# Patient Record
Sex: MALE | Race: BLACK OR AFRICAN AMERICAN | Employment: FULL TIME | URBAN - METROPOLITAN AREA
[De-identification: names, ages, dates, MRNs, and addresses within clinical notes are randomized per-mention and may not be internally consistent; named-entity substitution may affect disease eponyms.]

---

## 2023-11-27 ENCOUNTER — OFFICE VISIT (OUTPATIENT)
Dept: UROLOGY | Facility: CLINIC | Age: 43
End: 2023-11-27
Payer: COMMERCIAL

## 2023-11-27 VITALS — BODY MASS INDEX: 30.48 KG/M2 | HEIGHT: 73 IN | WEIGHT: 230 LBS

## 2023-11-27 DIAGNOSIS — F17.200 NEEDS SMOKING CESSATION EDUCATION: ICD-10-CM

## 2023-11-27 DIAGNOSIS — N52.9 ERECTILE DYSFUNCTION, UNSPECIFIED ERECTILE DYSFUNCTION TYPE: Primary | ICD-10-CM

## 2023-11-27 PROCEDURE — 99203 OFFICE O/P NEW LOW 30 MIN: CPT | Performed by: NURSE PRACTITIONER

## 2023-11-27 PROCEDURE — 99213 OFFICE O/P EST LOW 20 MIN: CPT | Performed by: NURSE PRACTITIONER

## 2023-11-27 RX ORDER — DOXYCYCLINE HYCLATE 100 MG
TABLET ORAL
COMMUNITY
Start: 2023-11-21

## 2023-11-27 ASSESSMENT — COLUMBIA-SUICIDE SEVERITY RATING SCALE - C-SSRS
6. HAVE YOU EVER DONE ANYTHING, STARTED TO DO ANYTHING, OR PREPARED TO DO ANYTHING TO END YOUR LIFE?: NO
2. HAVE YOU ACTUALLY HAD ANY THOUGHTS OF KILLING YOURSELF?: NO
1. IN THE PAST MONTH, HAVE YOU WISHED YOU WERE DEAD OR WISHED YOU COULD GO TO SLEEP AND NOT WAKE UP?: NO

## 2023-11-27 ASSESSMENT — PATIENT HEALTH QUESTIONNAIRE - PHQ9
SUM OF ALL RESPONSES TO PHQ9 QUESTIONS 1 AND 2: 0
1. LITTLE INTEREST OR PLEASURE IN DOING THINGS: NOT AT ALL
2. FEELING DOWN, DEPRESSED OR HOPELESS: NOT AT ALL

## 2023-11-27 ASSESSMENT — ENCOUNTER SYMPTOMS
DEPRESSION: 0
LOSS OF SENSATION IN FEET: 0
OCCASIONAL FEELINGS OF UNSTEADINESS: 0

## 2023-11-27 ASSESSMENT — PAIN SCALES - GENERAL: PAINLEVEL: 0-NO PAIN

## 2023-11-27 NOTE — PROGRESS NOTES
[facility]  Outpatient Clinic Note      Patient: Ben Burrows  Age/Sex: 43 y.o., male  MRN: [id number]      History of Present Illness  43-year-old gentleman presents as new patient for evaluation of difficulties obtaining morning erections. This has been ongoing for about 1.5 years, but feels it is worsening. He is otherwise able to obtain and maintain spontaneous erections. Reports adequate libido. He is not interested in medical therapy. He does work second shift and often third shift. He is a daily smoker. Unknown family history.     Past Medical & Surgical History  No past medical history on file.   Past Surgical History:   Procedure Laterality Date    CT ANGIO NECK  6/3/2023    CT ANGIO NECK 6/3/2023          Labs  Component      Latest Ref Rng 4/1/2022   GLUCOSE      74 - 99 mg/dL 102 (H)    SODIUM      136 - 145 mmol/L 136    POTASSIUM      3.5 - 5.3 mmol/L 5.0    CHLORIDE      98 - 107 mmol/L 102    Bicarbonate      21 - 32 mmol/L 28    Anion Gap      10 - 20 mmol/L 11    Blood Urea Nitrogen      6 - 23 mg/dL 16    Creatinine      0.50 - 1.30 mg/dL 1.14    GFR MALE      >90 mL/min/1.73m2 82    Calcium      8.6 - 10.6 mg/dL 10.0    Albumin      3.4 - 5.0 g/dL 4.6    Alkaline Phosphatase      33 - 120 U/L 68    Total Protein      6.4 - 8.2 g/dL 7.1    AST      9 - 39 U/L 25    Bilirubin Total      0.0 - 1.2 mg/dL 0.6    ALT      10 - 52 U/L 26    CHOLESTEROL      0 - 199 mg/dL 189    HDL CHOLESTEROL      mg/dL 59.4    Cholesterol/HDL Ratio 3.2    LDL Calculated      0 - 99 mg/dL 118 (H)    VLDL      0 - 40 mg/dL 12    TRIGLYCERIDES      0 - 149 mg/dL 59    Hemoglobin A1C      % 5.4    Estimated Average Glucose      MG/    Thyroid Stimulating Hormone      0.44 - 3.98 mIU/L 1.41    PSA      0.00 - 4.00 ng/mL 2.07       Legend:  (H) High    Medications:  No current outpatient medications on file prior to visit.     No current facility-administered medications on file prior to visit.           Physical Exam                                                                                                                      General: Well developed, well nourished, alert and cooperative, appears in no acute distress  Eyes: Non-injected conjunctiva, sclera clear, no proptosis  Cardiac: Extremities are warm and well perfused. No edema, cyanosis or pallor.   Lungs: Breathing is easy, non-labored. Speaking in clear and complete sentences. Normal diaphragmatic movement.  MSK: Ambulatory with steady gait, unassisted  Neuro: alert and oriented to person, place and time  Psych: Demonstrates good judgement and reason, without hallucinations, abnormal affect or abnormal behaviors.  Skin: no obvious lesions, no rashes      Review of Systems  Review of Systems   All other systems reviewed and are negative.         Imaging  NA      Assessment & Plan  43-year-old gentleman presents as new patient for evaluation of difficulties obtaining morning erections. This has been ongoing for about 1.5 years, but feels it is worsening. He is otherwise able to obtain and maintain spontaneous erections. Reports adequate libido. He is not interested in medical therapy. He does work second shift and often third shift. He is a daily smoker. Unknown family history.     Patient was seen today with the complaint of erectile dysfunction (ED). I went over the definition of ED, which is the inability to obtain or maintain an erection sufficient for satisfactory sexual activity. I outlined that erectile function is a complex interplay of neural, vascular, hormonal and psychological factors. Disruption in any of these pathways may lead to ED. In terms of treatment options; PDE5i (Viagra, Cialis, etc.), intracavernosal injections (ICI), vacuum erection devices (HARJINDER) and penile implants were discussed in detail.      Will obtain early AM testosterone and repeat PSA. Discussed smoking cessation in great detail. Recommend sleep hygiene, denies  COLIN. Recommend close follow-up with PCP to rule out underlying cardiac cause.    Follow-up 4 weeks to review labs, or sooner if needed.      Reviewed and approved by THEO MCDONALD on 11/27/23 at 9:49 AM.

## 2023-11-28 ENCOUNTER — LAB (OUTPATIENT)
Dept: LAB | Facility: LAB | Age: 43
End: 2023-11-28
Payer: COMMERCIAL

## 2023-11-28 DIAGNOSIS — N52.9 ERECTILE DYSFUNCTION, UNSPECIFIED ERECTILE DYSFUNCTION TYPE: ICD-10-CM

## 2023-11-28 LAB
PSA SERPL-MCNC: 1.89 NG/ML
TESTOST SERPL-MCNC: 608 NG/DL (ref 240–1000)

## 2023-11-28 PROCEDURE — 36415 COLL VENOUS BLD VENIPUNCTURE: CPT

## 2023-11-28 PROCEDURE — 84403 ASSAY OF TOTAL TESTOSTERONE: CPT

## 2023-11-28 PROCEDURE — 84153 ASSAY OF PSA TOTAL: CPT

## 2024-01-08 ENCOUNTER — APPOINTMENT (OUTPATIENT)
Dept: UROLOGY | Facility: CLINIC | Age: 44
End: 2024-01-08
Payer: COMMERCIAL

## 2024-04-18 RX ORDER — CLINDAMYCIN PHOSPHATE 11.9 MG/ML
SOLUTION TOPICAL
COMMUNITY
Start: 2023-11-21

## 2024-05-06 ENCOUNTER — APPOINTMENT (OUTPATIENT)
Dept: UROLOGY | Facility: CLINIC | Age: 44
End: 2024-05-06
Payer: COMMERCIAL
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
## 2024-07-16 ENCOUNTER — HOSPITAL ENCOUNTER (OUTPATIENT)
Dept: RADIOLOGY | Facility: HOSPITAL | Age: 44
Discharge: HOME | End: 2024-07-16
Payer: COMMERCIAL

## 2024-07-16 ENCOUNTER — LAB (OUTPATIENT)
Dept: LAB | Facility: LAB | Age: 44
End: 2024-07-16
Payer: COMMERCIAL

## 2024-07-16 ENCOUNTER — OFFICE VISIT (OUTPATIENT)
Dept: PRIMARY CARE | Facility: CLINIC | Age: 44
End: 2024-07-16
Payer: COMMERCIAL

## 2024-07-16 VITALS
DIASTOLIC BLOOD PRESSURE: 76 MMHG | HEART RATE: 72 BPM | TEMPERATURE: 97.9 F | OXYGEN SATURATION: 100 % | SYSTOLIC BLOOD PRESSURE: 114 MMHG

## 2024-07-16 DIAGNOSIS — Z13.6 SCREENING FOR HEART DISEASE: ICD-10-CM

## 2024-07-16 DIAGNOSIS — Z87.09 HISTORY OF PNEUMOTHORAX: ICD-10-CM

## 2024-07-16 DIAGNOSIS — Z13.9 SCREENING DUE: ICD-10-CM

## 2024-07-16 DIAGNOSIS — R07.9 CHEST PAIN, UNSPECIFIED TYPE: Primary | ICD-10-CM

## 2024-07-16 DIAGNOSIS — N52.9 ERECTILE DYSFUNCTION, UNSPECIFIED ERECTILE DYSFUNCTION TYPE: ICD-10-CM

## 2024-07-16 DIAGNOSIS — R07.9 CHEST PAIN, UNSPECIFIED TYPE: ICD-10-CM

## 2024-07-16 DIAGNOSIS — K21.9 GASTROESOPHAGEAL REFLUX DISEASE, UNSPECIFIED WHETHER ESOPHAGITIS PRESENT: ICD-10-CM

## 2024-07-16 LAB
ALBUMIN SERPL-MCNC: 4.4 G/DL (ref 3.5–5)
ALP BLD-CCNC: 66 U/L (ref 35–125)
ALT SERPL-CCNC: 20 U/L (ref 5–40)
ANION GAP SERPL CALC-SCNC: 11 MMOL/L
AST SERPL-CCNC: 27 U/L (ref 5–40)
BILIRUB SERPL-MCNC: 0.4 MG/DL (ref 0.1–1.2)
BUN SERPL-MCNC: 14 MG/DL (ref 8–25)
CALCIUM SERPL-MCNC: 9 MG/DL (ref 8.5–10.4)
CHLORIDE SERPL-SCNC: 104 MMOL/L (ref 97–107)
CHOLEST SERPL-MCNC: 210 MG/DL (ref 133–200)
CHOLEST/HDLC SERPL: 2.8 {RATIO}
CO2 SERPL-SCNC: 24 MMOL/L (ref 24–31)
CREAT SERPL-MCNC: 1.2 MG/DL (ref 0.4–1.6)
D DIMER PPP FEU-MCNC: <0.19 MG/L FEU (ref 0.19–0.5)
EGFRCR SERPLBLD CKD-EPI 2021: 76 ML/MIN/1.73M*2
ERYTHROCYTE [DISTWIDTH] IN BLOOD BY AUTOMATED COUNT: 14.1 % (ref 11.5–14.5)
GLUCOSE SERPL-MCNC: 110 MG/DL (ref 65–99)
HCT VFR BLD AUTO: 41.3 % (ref 41–52)
HDLC SERPL-MCNC: 76 MG/DL
HGB BLD-MCNC: 14 G/DL (ref 13.5–17.5)
LDLC SERPL CALC-MCNC: 120 MG/DL (ref 65–130)
MCH RBC QN AUTO: 30.3 PG (ref 26–34)
MCHC RBC AUTO-ENTMCNC: 33.9 G/DL (ref 32–36)
MCV RBC AUTO: 89 FL (ref 80–100)
MUCOUS THREADS #/AREA URNS AUTO: NORMAL /LPF
NRBC BLD-RTO: 0 /100 WBCS (ref 0–0)
PLATELET # BLD AUTO: 221 X10*3/UL (ref 150–450)
POTASSIUM SERPL-SCNC: 4.7 MMOL/L (ref 3.4–5.1)
PROT SERPL-MCNC: 6.3 G/DL (ref 5.9–7.9)
RBC # BLD AUTO: 4.62 X10*6/UL (ref 4.5–5.9)
RBC #/AREA URNS AUTO: NORMAL /HPF
SODIUM SERPL-SCNC: 139 MMOL/L (ref 133–145)
TRIGL SERPL-MCNC: 71 MG/DL (ref 40–150)
WBC # BLD AUTO: 4.9 X10*3/UL (ref 4.4–11.3)
WBC #/AREA URNS AUTO: NORMAL /HPF

## 2024-07-16 PROCEDURE — 80053 COMPREHEN METABOLIC PANEL: CPT

## 2024-07-16 PROCEDURE — 99214 OFFICE O/P EST MOD 30 MIN: CPT | Performed by: FAMILY MEDICINE

## 2024-07-16 PROCEDURE — 85027 COMPLETE CBC AUTOMATED: CPT

## 2024-07-16 PROCEDURE — 71046 X-RAY EXAM CHEST 2 VIEWS: CPT | Performed by: RADIOLOGY

## 2024-07-16 PROCEDURE — 85300 ANTITHROMBIN III ACTIVITY: CPT

## 2024-07-16 PROCEDURE — 81001 URINALYSIS AUTO W/SCOPE: CPT

## 2024-07-16 PROCEDURE — 80061 LIPID PANEL: CPT

## 2024-07-16 PROCEDURE — 93000 ELECTROCARDIOGRAM COMPLETE: CPT | Performed by: FAMILY MEDICINE

## 2024-07-16 PROCEDURE — 93005 ELECTROCARDIOGRAM TRACING: CPT | Performed by: FAMILY MEDICINE

## 2024-07-16 PROCEDURE — 36415 COLL VENOUS BLD VENIPUNCTURE: CPT

## 2024-07-16 PROCEDURE — 71046 X-RAY EXAM CHEST 2 VIEWS: CPT

## 2024-07-16 ASSESSMENT — COLUMBIA-SUICIDE SEVERITY RATING SCALE - C-SSRS
1. IN THE PAST MONTH, HAVE YOU WISHED YOU WERE DEAD OR WISHED YOU COULD GO TO SLEEP AND NOT WAKE UP?: NO
6. HAVE YOU EVER DONE ANYTHING, STARTED TO DO ANYTHING, OR PREPARED TO DO ANYTHING TO END YOUR LIFE?: NO
2. HAVE YOU ACTUALLY HAD ANY THOUGHTS OF KILLING YOURSELF?: NO

## 2024-07-16 ASSESSMENT — ENCOUNTER SYMPTOMS
DEPRESSION: 0
OCCASIONAL FEELINGS OF UNSTEADINESS: 0
LOSS OF SENSATION IN FEET: 0

## 2024-07-16 ASSESSMENT — PAIN SCALES - GENERAL: PAINLEVEL: 0-NO PAIN

## 2024-07-16 ASSESSMENT — PATIENT HEALTH QUESTIONNAIRE - PHQ9
2. FEELING DOWN, DEPRESSED OR HOPELESS: NOT AT ALL
1. LITTLE INTEREST OR PLEASURE IN DOING THINGS: NOT AT ALL
SUM OF ALL RESPONSES TO PHQ9 QUESTIONS 1 AND 2: 0

## 2024-07-22 PROBLEM — E78.49 OTHER HYPERLIPIDEMIA: Status: ACTIVE | Noted: 2024-07-22

## 2024-07-22 PROBLEM — N52.9 MALE ERECTILE DISORDER: Status: ACTIVE | Noted: 2024-07-22
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
## 2024-07-31 ENCOUNTER — OFFICE VISIT (OUTPATIENT)
Dept: CARDIOLOGY | Facility: CLINIC | Age: 44
End: 2024-07-31
Payer: COMMERCIAL

## 2024-07-31 VITALS
OXYGEN SATURATION: 97 % | RESPIRATION RATE: 20 BRPM | TEMPERATURE: 98 F | DIASTOLIC BLOOD PRESSURE: 72 MMHG | WEIGHT: 262 LBS | HEART RATE: 84 BPM | SYSTOLIC BLOOD PRESSURE: 112 MMHG | BODY MASS INDEX: 34.72 KG/M2 | HEIGHT: 73 IN

## 2024-07-31 DIAGNOSIS — R07.9 CHEST PAIN, UNSPECIFIED TYPE: ICD-10-CM

## 2024-07-31 DIAGNOSIS — E78.49 OTHER HYPERLIPIDEMIA: Primary | ICD-10-CM

## 2024-07-31 DIAGNOSIS — K21.9 GASTROESOPHAGEAL REFLUX DISEASE WITHOUT ESOPHAGITIS: ICD-10-CM

## 2024-07-31 PROBLEM — S22.41XA MULTIPLE FRACTURES OF RIBS, RIGHT SIDE, INITIAL ENCOUNTER FOR CLOSED FRACTURE: Status: ACTIVE | Noted: 2023-06-03

## 2024-07-31 PROBLEM — S27.0XXA TRAUMATIC PNEUMOTHORAX: Status: ACTIVE | Noted: 2023-06-03

## 2024-07-31 PROBLEM — M25.511 RIGHT SHOULDER PAIN: Status: ACTIVE | Noted: 2024-07-31

## 2024-07-31 PROBLEM — E55.9 VITAMIN D DEFICIENCY: Status: ACTIVE | Noted: 2024-07-31

## 2024-07-31 PROCEDURE — 99213 OFFICE O/P EST LOW 20 MIN: CPT | Performed by: INTERNAL MEDICINE

## 2024-07-31 PROCEDURE — 99407 BEHAV CHNG SMOKING > 10 MIN: CPT | Performed by: INTERNAL MEDICINE

## 2024-07-31 PROCEDURE — 1036F TOBACCO NON-USER: CPT | Performed by: INTERNAL MEDICINE

## 2024-07-31 PROCEDURE — 99203 OFFICE O/P NEW LOW 30 MIN: CPT | Performed by: INTERNAL MEDICINE

## 2024-07-31 PROCEDURE — 3008F BODY MASS INDEX DOCD: CPT | Performed by: INTERNAL MEDICINE

## 2024-07-31 RX ORDER — NAPROXEN SODIUM 220 MG/1
81 TABLET, FILM COATED ORAL DAILY
Qty: 90 TABLET | Refills: 3 | Status: SHIPPED | OUTPATIENT
Start: 2024-07-31 | End: 2025-07-31

## 2024-07-31 ASSESSMENT — LIFESTYLE VARIABLES
HOW OFTEN DO YOU HAVE SIX OR MORE DRINKS ON ONE OCCASION: NEVER
AUDIT TOTAL SCORE: 4
AUDIT-C TOTAL SCORE: 4
HOW OFTEN DO YOU HAVE A DRINK CONTAINING ALCOHOL: 4 OR MORE TIMES A WEEK
HOW MANY STANDARD DRINKS CONTAINING ALCOHOL DO YOU HAVE ON A TYPICAL DAY: 1 OR 2
HAS A RELATIVE, FRIEND, DOCTOR, OR ANOTHER HEALTH PROFESSIONAL EXPRESSED CONCERN ABOUT YOUR DRINKING OR SUGGESTED YOU CUT DOWN: NO
HAVE YOU OR SOMEONE ELSE BEEN INJURED AS A RESULT OF YOUR DRINKING: NO
SKIP TO QUESTIONS 9-10: 1

## 2024-07-31 ASSESSMENT — PAIN SCALES - GENERAL: PAINLEVEL: 0-NO PAIN

## 2024-07-31 ASSESSMENT — ENCOUNTER SYMPTOMS
LOSS OF SENSATION IN FEET: 0
DEPRESSION: 0
OCCASIONAL FEELINGS OF UNSTEADINESS: 0
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
## 2024-10-23 ENCOUNTER — APPOINTMENT (OUTPATIENT)
Dept: RADIOLOGY | Facility: HOSPITAL | Age: 44
End: 2024-10-23
Payer: COMMERCIAL

## 2024-10-24 ENCOUNTER — HOSPITAL ENCOUNTER (EMERGENCY)
Facility: HOSPITAL | Age: 44
Discharge: HOME | End: 2024-10-24
Attending: EMERGENCY MEDICINE
Payer: COMMERCIAL

## 2024-10-24 ENCOUNTER — APPOINTMENT (OUTPATIENT)
Dept: RADIOLOGY | Facility: HOSPITAL | Age: 44
End: 2024-10-24
Payer: COMMERCIAL

## 2024-10-24 ENCOUNTER — APPOINTMENT (OUTPATIENT)
Dept: CARDIOLOGY | Facility: HOSPITAL | Age: 44
End: 2024-10-24
Payer: COMMERCIAL

## 2024-10-24 VITALS
SYSTOLIC BLOOD PRESSURE: 138 MMHG | HEART RATE: 60 BPM | DIASTOLIC BLOOD PRESSURE: 82 MMHG | HEIGHT: 73 IN | TEMPERATURE: 97.7 F | RESPIRATION RATE: 18 BRPM | OXYGEN SATURATION: 100 % | WEIGHT: 250 LBS | BODY MASS INDEX: 33.13 KG/M2

## 2024-10-24 DIAGNOSIS — M79.662 PAIN OF LEFT CALF: Primary | ICD-10-CM

## 2024-10-24 DIAGNOSIS — R07.9 CHEST PAIN, UNSPECIFIED TYPE: ICD-10-CM

## 2024-10-24 DIAGNOSIS — J18.9 COMMUNITY ACQUIRED PNEUMONIA OF RIGHT LOWER LOBE OF LUNG: ICD-10-CM

## 2024-10-24 LAB
ALBUMIN SERPL BCP-MCNC: 4.1 G/DL (ref 3.4–5)
ALP SERPL-CCNC: 58 U/L (ref 33–120)
ALT SERPL W P-5'-P-CCNC: 23 U/L (ref 10–52)
ANION GAP SERPL CALCULATED.3IONS-SCNC: 9 MMOL/L (ref 10–20)
APPEARANCE UR: CLEAR
AST SERPL W P-5'-P-CCNC: 25 U/L (ref 9–39)
BASOPHILS # BLD AUTO: 0.03 X10*3/UL (ref 0–0.1)
BASOPHILS NFR BLD AUTO: 0.5 %
BILIRUB SERPL-MCNC: 0.4 MG/DL (ref 0–1.2)
BILIRUB UR STRIP.AUTO-MCNC: NEGATIVE MG/DL
BNP SERPL-MCNC: 5 PG/ML (ref 0–99)
BUN SERPL-MCNC: 15 MG/DL (ref 6–23)
CALCIUM SERPL-MCNC: 8.5 MG/DL (ref 8.6–10.3)
CARDIAC TROPONIN I PNL SERPL HS: 4 NG/L (ref 0–20)
CARDIAC TROPONIN I PNL SERPL HS: 4 NG/L (ref 0–20)
CHLORIDE SERPL-SCNC: 99 MMOL/L (ref 98–107)
CO2 SERPL-SCNC: 25 MMOL/L (ref 21–32)
COLOR UR: ABNORMAL
CREAT SERPL-MCNC: 1.25 MG/DL (ref 0.5–1.3)
EGFRCR SERPLBLD CKD-EPI 2021: 72 ML/MIN/1.73M*2
EOSINOPHIL # BLD AUTO: 0.15 X10*3/UL (ref 0–0.7)
EOSINOPHIL NFR BLD AUTO: 2.4 %
ERYTHROCYTE [DISTWIDTH] IN BLOOD BY AUTOMATED COUNT: 12.8 % (ref 11.5–14.5)
GLUCOSE SERPL-MCNC: 99 MG/DL (ref 74–99)
GLUCOSE UR STRIP.AUTO-MCNC: NORMAL MG/DL
HCT VFR BLD AUTO: 41 % (ref 41–52)
HGB BLD-MCNC: 14.3 G/DL (ref 13.5–17.5)
HOLD SPECIMEN: NORMAL
IMM GRANULOCYTES # BLD AUTO: 0.01 X10*3/UL (ref 0–0.7)
IMM GRANULOCYTES NFR BLD AUTO: 0.2 % (ref 0–0.9)
KETONES UR STRIP.AUTO-MCNC: NEGATIVE MG/DL
LEUKOCYTE ESTERASE UR QL STRIP.AUTO: NEGATIVE
LIPASE SERPL-CCNC: 44 U/L (ref 9–82)
LYMPHOCYTES # BLD AUTO: 1.8 X10*3/UL (ref 1.2–4.8)
LYMPHOCYTES NFR BLD AUTO: 28.5 %
MAGNESIUM SERPL-MCNC: 1.9 MG/DL (ref 1.6–2.4)
MCH RBC QN AUTO: 31.2 PG (ref 26–34)
MCHC RBC AUTO-ENTMCNC: 34.9 G/DL (ref 32–36)
MCV RBC AUTO: 90 FL (ref 80–100)
MONOCYTES # BLD AUTO: 0.48 X10*3/UL (ref 0.1–1)
MONOCYTES NFR BLD AUTO: 7.6 %
NEUTROPHILS # BLD AUTO: 3.84 X10*3/UL (ref 1.2–7.7)
NEUTROPHILS NFR BLD AUTO: 60.8 %
NITRITE UR QL STRIP.AUTO: NEGATIVE
NRBC BLD-RTO: 0 /100 WBCS (ref 0–0)
PH UR STRIP.AUTO: 5.5 [PH]
PLATELET # BLD AUTO: 224 X10*3/UL (ref 150–450)
POTASSIUM SERPL-SCNC: 4.1 MMOL/L (ref 3.5–5.3)
PROT SERPL-MCNC: 6.5 G/DL (ref 6.4–8.2)
PROT UR STRIP.AUTO-MCNC: ABNORMAL MG/DL
RBC # BLD AUTO: 4.58 X10*6/UL (ref 4.5–5.9)
RBC # UR STRIP.AUTO: NEGATIVE /UL
RBC #/AREA URNS AUTO: NORMAL /HPF
SODIUM SERPL-SCNC: 129 MMOL/L (ref 136–145)
SP GR UR STRIP.AUTO: >1.05
UROBILINOGEN UR STRIP.AUTO-MCNC: NORMAL MG/DL
WBC # BLD AUTO: 6.3 X10*3/UL (ref 4.4–11.3)
WBC #/AREA URNS AUTO: NORMAL /HPF

## 2024-10-24 PROCEDURE — 80053 COMPREHEN METABOLIC PANEL: CPT

## 2024-10-24 PROCEDURE — 99285 EMERGENCY DEPT VISIT HI MDM: CPT | Mod: 25

## 2024-10-24 PROCEDURE — 83880 ASSAY OF NATRIURETIC PEPTIDE: CPT

## 2024-10-24 PROCEDURE — 84484 ASSAY OF TROPONIN QUANT: CPT

## 2024-10-24 PROCEDURE — 73590 X-RAY EXAM OF LOWER LEG: CPT | Mod: LT

## 2024-10-24 PROCEDURE — 93971 EXTREMITY STUDY: CPT

## 2024-10-24 PROCEDURE — 93971 EXTREMITY STUDY: CPT | Performed by: RADIOLOGY

## 2024-10-24 PROCEDURE — 73590 X-RAY EXAM OF LOWER LEG: CPT | Mod: LEFT SIDE | Performed by: RADIOLOGY

## 2024-10-24 PROCEDURE — 2550000001 HC RX 255 CONTRASTS: Performed by: EMERGENCY MEDICINE

## 2024-10-24 PROCEDURE — 72110 X-RAY EXAM L-2 SPINE 4/>VWS: CPT | Performed by: RADIOLOGY

## 2024-10-24 PROCEDURE — 93005 ELECTROCARDIOGRAM TRACING: CPT

## 2024-10-24 PROCEDURE — 85025 COMPLETE CBC W/AUTO DIFF WBC: CPT

## 2024-10-24 PROCEDURE — 36415 COLL VENOUS BLD VENIPUNCTURE: CPT

## 2024-10-24 PROCEDURE — 81001 URINALYSIS AUTO W/SCOPE: CPT

## 2024-10-24 PROCEDURE — 2500000001 HC RX 250 WO HCPCS SELF ADMINISTERED DRUGS (ALT 637 FOR MEDICARE OP)

## 2024-10-24 PROCEDURE — 83690 ASSAY OF LIPASE: CPT

## 2024-10-24 PROCEDURE — 83735 ASSAY OF MAGNESIUM: CPT

## 2024-10-24 PROCEDURE — 72110 X-RAY EXAM L-2 SPINE 4/>VWS: CPT

## 2024-10-24 PROCEDURE — 71275 CT ANGIOGRAPHY CHEST: CPT

## 2024-10-24 RX ORDER — KETOROLAC TROMETHAMINE 30 MG/ML
15 INJECTION, SOLUTION INTRAMUSCULAR; INTRAVENOUS ONCE
Status: DISCONTINUED | OUTPATIENT
Start: 2024-10-24 | End: 2024-10-24 | Stop reason: HOSPADM

## 2024-10-24 RX ORDER — ACETAMINOPHEN 325 MG/1
975 TABLET ORAL ONCE
Status: COMPLETED | OUTPATIENT
Start: 2024-10-24 | End: 2024-10-24

## 2024-10-24 RX ORDER — DOXYCYCLINE 100 MG/1
100 TABLET ORAL 2 TIMES DAILY
Qty: 20 TABLET | Refills: 0 | Status: SHIPPED | OUTPATIENT
Start: 2024-10-24 | End: 2024-11-03

## 2024-10-24 RX ADMIN — ACETAMINOPHEN 325MG 975 MG: 325 TABLET ORAL at 12:15

## 2024-10-24 RX ADMIN — IOHEXOL 75 ML: 350 INJECTION, SOLUTION INTRAVENOUS at 11:58

## 2024-10-24 ASSESSMENT — LIFESTYLE VARIABLES
EVER HAD A DRINK FIRST THING IN THE MORNING TO STEADY YOUR NERVES TO GET RID OF A HANGOVER: NO
HAVE YOU EVER FELT YOU SHOULD CUT DOWN ON YOUR DRINKING: NO
HAVE PEOPLE ANNOYED YOU BY CRITICIZING YOUR DRINKING: NO
TOTAL SCORE: 0
EVER FELT BAD OR GUILTY ABOUT YOUR DRINKING: NO

## 2024-10-24 ASSESSMENT — PAIN DESCRIPTION - ORIENTATION: ORIENTATION: LEFT

## 2024-10-24 ASSESSMENT — PAIN SCALES - GENERAL
PAINLEVEL_OUTOF10: 8
PAINLEVEL_OUTOF10: 8

## 2024-10-24 ASSESSMENT — PAIN DESCRIPTION - LOCATION: LOCATION: LEG

## 2024-10-24 ASSESSMENT — PAIN - FUNCTIONAL ASSESSMENT: PAIN_FUNCTIONAL_ASSESSMENT: 0-10

## 2024-10-24 NOTE — PROGRESS NOTES
Attestation/Supervisory note for SONA Argueta      The patient is a 44-year-old male presenting to the emergency department for evaluation of left leg pain, chest pain, and shortness of breath.  He states that he has been having the chest pain and the shortness of breath for the past week or so.  He has had the left leg pain for the past couple of months.  He is concerned that he may have a blood clot.  He states that he does smoke daily.  He denies any recent injury or trauma.  He denies any recent travel or immobility.  He denies any recent surgery.  He denies any history of CAD or ACS.  No history of PE or DVT.  He denies any headache or visual changes.  No focal weakness or numbness.  No fever or chills.  No palpitations.  No diaphoresis.  The chest pain is substernal.  No better or worse.  No radiation.  No abdominal pain.  No nausea vomiting.  No diarrhea or constipation but no urinary complaints.  He states he has seen his primary care physician about his symptoms but he does not recall what tests were done.  He states he did have some testing done.  All pertinent positives and negatives are recorded above.  All other systems reviewed and otherwise negative.  Vital signs with mild diastolic hypertension but otherwise within normal limits.  Physical exam with a well-nourished well-developed male in no acute distress.  HEENT exam within normal limits.  He has no evidence of airway compromise or respiratory distress.  Abdominal exam is benign.  He does not have any gross motor, neurologic or vascular deficits on exam.  Pulses are equal bilaterally.      EKG with normal sinus rhythm at 60 bpm, normal axis, normal voltage, normal ST segment, and normal T waves      Oral acetaminophen and IV Toradol ordered      Diagnostic labs without significant abnormality      Initial troponin 4.  Repeat troponin 4      Heart score is a 1      XR tibia fibula left 2 views   Final Result   1. No acute osseous left tibia/fibula         2. Chronic enthesopathy tibial tuberosity insertion patellar tendon.        Signed by: Rene Gomez 10/24/2024 1:14 PM   Dictation workstation:   [id number]      XR lumbar spine complete 4+ views   Final Result   1. No compression deformity lumbar spine        2. Degenerative discogenic change with mild loss of disc space height   and endplate sclerosis T10/11.        Signed by: Rene Gomez 10/24/2024 1:17 PM   Dictation workstation:   [id number]      CT angio chest for pulmonary embolism   Final Result   No evidence of PE.        Small micronodular right lower lobe infiltrates consistent with   pneumonia. Follow-up to ensure resolution after appropriate treatment   recommended. Slight heterogeneity of the bilateral lung bases,   possible mosaic attenuation related to small airways disease and air   trapping.        Small hiatal hernia.        MACRO:   None.        Signed by: Preethi Miguel 10/24/2024 12:26 PM   Dictation workstation:   [id number]      Vascular US lower extremity venous duplex left   Final Result   Negative study.  No deep venous thrombosis of the left lower   extremity.        MACRO:   None        Signed by: Austin Yanes 10/24/2024 4:36 PM   Dictation workstation:   [id number]           The patient does not have any evidence of ischemia on EKG or cardiac enzymes.  No events on telemetry.  He does not have any evidence of airway compromise or respiratory distress on exam.  He does not have any gross motor, neurologic or vascular deficits on exam.  He is well-perfused on exam.  He does not have any evidence of hemodynamic instability.  X-rays of the left tib-fib do not show any evidence of fracture or dislocation.  X-rays of the LS spine without evidence of fracture or dislocation.  There is evidence of degenerative disease.  The CT angio chest shows no evidence of PE or dissection.  There are some small micronodule right lower lobe infiltrates consistent with pneumonia per the  radiologist reading.  The left lower extremity duplex shows no evidence of DVT.      The patient eloped without waiting for the results of his diagnostic studies.  A prescription for doxycycline was called into his pharmacy.  Nursing staff were notified and will contact the pt to  the prescription.      Impression/diagnosis:  1.  Community-acquired pneumonia, right lower lung  2.  Chest pain, substernal  3.  Dyspnea, dyspnea on exertion  4.  Left lower extremity pain, chronic      I personally saw the patient and made/approve the management plan and take responsibility for the patient management.      I independently interpreted the following study (S) EKG and diagnostic labs      I personally discussed the patient's management with the patient      I reviewed the results of the diagnostic labs and diagnostic imaging.  Formal radiology read was completed by the radiologist.      Jennifer Bahena MD

## 2024-10-24 NOTE — ED PROVIDER NOTES
HPI   Chief Complaint   Patient presents with    Leg Pain     Patient reports left leg pain, believes he has a blood clot.  He wears compression stockings.  He also reports  chest tightness and sob for about a week, leg pain for a couple months, believes leg is swollen but not red or hot.       Patient is a 44-year-old male presents emergency department for evaluation of chest pain and left leg pain.  Patient states that for the last 1 to 2 months he has had intermittent pains in his left calf.  He describes as a dull aching pain and occasionally he will feel like his left calf is swollen.  He states that it comes and goes is worse with range of motion and movement.  He is concerned that he may have a blood clot.  He denies any history of blood clots in the past denies any recent immobilization, long travel.  He denies any recent injury or trauma that he recalls.  He states occasionally he will feel tingling in his left foot and occasionally have some pain in his low back, but denies any bowel or bladder incontinence, saddle anesthesia, numbness continuing.  He states that also he became concerned as intermittently over the last week he has been having pain in the center of his chest.  He states that it comes on sporadically and only last for several minutes and then dissipates.  He states that it is not worse with exertion.  He describes it as a dull aching pain in the center of his chest.  He does not feel short of breath with it.  He denies any lightheadedness, dizziness, cough, congestion, fevers, chills, nausea, vomiting, abdominal pain.  He denies any history of heart or lung disease.  Does not take any daily medications.      History provided by:  Patient   used: No            Patient History   Past Medical History:   Diagnosis Date    Asthma      Past Surgical History:   Procedure Laterality Date    CT ANGIO NECK  6/3/2023    CT ANGIO NECK 6/3/2023     Family History   Problem Relation  Name Age of Onset    Heart attack Brother Sage     Diabetes Maternal Grandfather Will     Heart disease Maternal Grandfather Will     Depression Mother Fabi      Social History     Tobacco Use    Smoking status: Former     Current packs/day: 0.00     Average packs/day: 1 pack/day for 4.1 years (4.1 ttl pk-yrs)     Types: Cigarettes, Cigars     Start date: 2/15/1993     Quit date: 1997     Years since quittin.5     Passive exposure: Never    Smokeless tobacco: Never   Vaping Use    Vaping status: Never Used   Substance Use Topics    Alcohol use: Yes     Alcohol/week: 3.0 standard drinks of alcohol     Types: 1 Cans of beer, 2 Shots of liquor per week     Comment: Daily    Drug use: Yes     Types: Marijuana     Comment: daily       Physical Exam   ED Triage Vitals [10/24/24 0918]   Temperature Heart Rate Respirations BP   36.5 °C (97.7 °F) 71 18 128/79      Pulse Ox Temp Source Heart Rate Source Patient Position   100 % Temporal Monitor Sitting      BP Location FiO2 (%)     Right arm --       Physical Exam  Constitutional:       Appearance: Normal appearance.   Cardiovascular:      Rate and Rhythm: Normal rate and regular rhythm.   Pulmonary:      Effort: Pulmonary effort is normal.      Breath sounds: Normal breath sounds.   Abdominal:      General: Abdomen is flat.      Palpations: Abdomen is soft.      Tenderness: There is no abdominal tenderness.   Musculoskeletal:         General: Normal range of motion.      Comments: No midline spinal tenderness or bony step-offs.  Tenderness palpation over left calf with left lower extremity with good distal pulses, range of motion intact and good color.  Left lower extremity with good strength and reflexes.   Skin:     General: Skin is warm and dry.      Comments: No swelling to left calf with no asymmetry in bilateral lower extremities.   Neurological:      General: No focal deficit present.      Mental Status: He is alert and oriented to person, place, and time.            ED Course & MDM   Diagnoses as of 10/24/24 1814   Pain of left calf   Chest pain, unspecified type                 No data recorded     [place] Coma Scale Score: 15 (10/24/24 0921 : Thelma Spears RN)                           Medical Decision Making  Patient is a 44-year-old male presents emergency department for evaluation of left leg pain and chest pain.    EKG was interpreted by attending physician and reviewed by me.    Lab work done today included CMP, CBC, magnesium, lipase, troponins, proBNP, urinalysis.  Lab work with troponins within normal range, mild hyponatremia, and otherwise unremarkable.    Scans done today were interpreted/confirmed by radiologist and also interpreted by me which includedx-ray lumbar spine, x-ray left tibia/fibula,  CTA chest, and left lower extremity vascular ultrasound..  X-ray tibia/fibula shows no acute osseous abnormality with chronic enthesopathy tibial tuberosity insertion patellar tendon.  X-ray lumbar spine shows no compression deformity in the lumbar spine with degenerative discogenic changes with mild loss of disc space height and endplate sclerosis at T10-T11.  CT angio chest shows no evidence for PE with small micronodular right lower lobe infiltrates consistent with pneumonia.  Ultrasound shows negative study with no evidence for DVT in the left lower extremity.    Medications given at today's visit include IV Toradol, p.o. Tylenol    I saw this patient in conjunction with Dr. Bahena.  Given patient's symptoms today, cardiac workup initiated as well as chest x-ray and CT angio to rule out blood clot in the lungs.  Ultrasound ordered for left lower extremity to rule out blood clot as well.  Workup today relatively unremarkable with CT angio chest showing evidence of possible micronodular infiltrates consistent with pneumonia with possible mosaic attenuation related to small airway disease and air trapping with no evidence for PE.  Ultrasound shows no evidence  for DVT.  Upon rooming patient to discuss results today, patient was found to have left from the emergency department with treatment incomplete.  He did not wait to receive results of his care today and left without further care.    ** Disclaimer:  Parts of this document were written utilizing a voice to text dictation software.  Note may contain minor transcription or typographical errors that were inadvertently transcribed by the computer software.        Procedure  Procedures     Ariane Argueta PA-C  10/24/24 1811

## 2024-10-24 NOTE — ED TRIAGE NOTES
Patient reports left leg pain, believes he has a blood clot.  He wears compression stockings.  He also reports  chest tightness and sob for about a week, leg pain for a couple months, believes leg is swollen but not red or hot.

## 2024-10-28 LAB
ATRIAL RATE: 68 BPM
P AXIS: 39 DEGREES
P OFFSET: 185 MS
P ONSET: 126 MS
PR INTERVAL: 186 MS
Q ONSET: 219 MS
QRS COUNT: 12 BEATS
QRS DURATION: 98 MS
QT INTERVAL: 384 MS
QTC CALCULATION(BAZETT): 408 MS
QTC FREDERICIA: 400 MS
R AXIS: 15 DEGREES
T AXIS: 41 DEGREES
T OFFSET: 411 MS
VENTRICULAR RATE: 68 BPM
